# Patient Record
Sex: FEMALE | Race: OTHER | NOT HISPANIC OR LATINO
[De-identification: names, ages, dates, MRNs, and addresses within clinical notes are randomized per-mention and may not be internally consistent; named-entity substitution may affect disease eponyms.]

---

## 2021-03-30 PROBLEM — Z00.00 ENCOUNTER FOR PREVENTIVE HEALTH EXAMINATION: Status: ACTIVE | Noted: 2021-03-30

## 2021-04-06 ENCOUNTER — APPOINTMENT (OUTPATIENT)
Dept: CARDIOLOGY | Facility: CLINIC | Age: 86
End: 2021-04-06
Payer: MEDICARE

## 2021-04-06 VITALS
TEMPERATURE: 98.4 F | OXYGEN SATURATION: 94 % | WEIGHT: 127 LBS | SYSTOLIC BLOOD PRESSURE: 110 MMHG | BODY MASS INDEX: 26.66 KG/M2 | DIASTOLIC BLOOD PRESSURE: 78 MMHG | HEIGHT: 58 IN | HEART RATE: 143 BPM

## 2021-04-06 DIAGNOSIS — Z78.9 OTHER SPECIFIED HEALTH STATUS: ICD-10-CM

## 2021-04-06 DIAGNOSIS — Z87.898 PERSONAL HISTORY OF OTHER SPECIFIED CONDITIONS: ICD-10-CM

## 2021-04-06 DIAGNOSIS — I48.91 UNSPECIFIED ATRIAL FIBRILLATION: ICD-10-CM

## 2021-04-06 DIAGNOSIS — I10 ESSENTIAL (PRIMARY) HYPERTENSION: ICD-10-CM

## 2021-04-06 DIAGNOSIS — I50.9 HEART FAILURE, UNSPECIFIED: ICD-10-CM

## 2021-04-06 DIAGNOSIS — Z86.79 PERSONAL HISTORY OF OTHER DISEASES OF THE CIRCULATORY SYSTEM: ICD-10-CM

## 2021-04-06 PROCEDURE — 93000 ELECTROCARDIOGRAM COMPLETE: CPT

## 2021-04-06 PROCEDURE — 99205 OFFICE O/P NEW HI 60 MIN: CPT

## 2021-04-06 RX ORDER — FUROSEMIDE 40 MG/1
40 TABLET ORAL
Qty: 30 | Refills: 1 | Status: ACTIVE | COMMUNITY
Start: 2021-04-06 | End: 1900-01-01

## 2021-04-06 NOTE — REASON FOR VISIT
[Consultation] : a consultation regarding [Atrial Fibrillation] : atrial fibrillation [Heart Failure] : congestive heart failure [Other: _____] : [unfilled]

## 2021-04-07 PROBLEM — I50.9 CONGESTIVE HEART FAILURE, UNSPECIFIED HF CHRONICITY, UNSPECIFIED HEART FAILURE TYPE: Status: ACTIVE | Noted: 2021-04-06

## 2021-04-07 PROBLEM — I10 HYPERTENSION, UNSPECIFIED TYPE: Status: ACTIVE | Noted: 2021-04-06

## 2021-04-07 PROBLEM — Z78.9 CAFFEINE USE: Status: ACTIVE | Noted: 2021-04-06

## 2021-04-07 PROBLEM — Z86.79 HISTORY OF ATRIAL FIBRILLATION: Status: RESOLVED | Noted: 2021-04-06 | Resolved: 2021-04-07

## 2021-04-07 PROBLEM — Z87.898 HISTORY OF EDEMA: Status: RESOLVED | Noted: 2021-04-06 | Resolved: 2021-04-07

## 2021-04-07 PROBLEM — Z78.9 NON-SMOKER: Status: ACTIVE | Noted: 2021-04-06

## 2021-04-07 RX ORDER — MEMANTINE HYDROCHLORIDE 7 MG/1
7 CAPSULE, EXTENDED RELEASE ORAL
Qty: 30 | Refills: 0 | Status: ACTIVE | COMMUNITY
Start: 2020-12-15

## 2021-04-07 RX ORDER — ZOLPIDEM TARTRATE 5 MG/1
5 TABLET ORAL
Qty: 15 | Refills: 0 | Status: ACTIVE | COMMUNITY
Start: 2020-11-23

## 2021-04-07 RX ORDER — AMMONIUM LACTATE 12 %
12 CREAM (GRAM) TOPICAL
Qty: 140 | Refills: 0 | Status: ACTIVE | COMMUNITY
Start: 2021-01-13

## 2021-04-07 RX ORDER — ICOSAPENT ETHYL 1000 MG/1
1 CAPSULE ORAL
Qty: 120 | Refills: 0 | Status: ACTIVE | COMMUNITY
Start: 2021-01-13

## 2021-04-07 RX ORDER — MEMANTINE HYDROCHLORIDE 14 MG/1
14 CAPSULE, EXTENDED RELEASE ORAL
Qty: 90 | Refills: 0 | Status: ACTIVE | COMMUNITY
Start: 2021-03-16

## 2021-04-07 RX ORDER — FUROSEMIDE 20 MG/1
20 TABLET ORAL
Qty: 30 | Refills: 0 | Status: ACTIVE | COMMUNITY
Start: 2021-02-18

## 2021-04-07 RX ORDER — GABAPENTIN 100 MG/1
100 CAPSULE ORAL
Qty: 30 | Refills: 0 | Status: ACTIVE | COMMUNITY
Start: 2021-03-16

## 2021-04-07 RX ORDER — KETOCONAZOLE 20 MG/G
2 CREAM TOPICAL
Qty: 60 | Refills: 0 | Status: ACTIVE | COMMUNITY
Start: 2021-01-14

## 2021-04-07 RX ORDER — AZITHROMYCIN 250 MG/1
250 TABLET, FILM COATED ORAL
Qty: 6 | Refills: 0 | Status: ACTIVE | COMMUNITY
Start: 2021-01-07

## 2021-04-07 RX ORDER — LOSARTAN POTASSIUM 25 MG/1
25 TABLET, FILM COATED ORAL
Qty: 60 | Refills: 0 | Status: ACTIVE | COMMUNITY
Start: 2021-01-13

## 2021-04-07 RX ORDER — METOPROLOL TARTRATE 25 MG/1
25 TABLET, FILM COATED ORAL
Qty: 30 | Refills: 0 | Status: ACTIVE | COMMUNITY
Start: 2021-03-16

## 2021-04-07 RX ORDER — DOXEPIN 3 MG/1
3 TABLET, FILM COATED ORAL
Qty: 30 | Refills: 0 | Status: ACTIVE | COMMUNITY
Start: 2021-03-16

## 2021-04-07 NOTE — ASSESSMENT
[FreeTextEntry1] : 89 yo Iraqi-speaking F with HTN, HLD, paroxysmal A Fib not on systemic anticoagulation who presents for initial evaluation of exertional dyspnea. \par \par 1. Dyspnea: Based on exam patient is likely in heart failure exacerbation (?HFpEF based on recent reported echo), which in turn may be potentiated by atrial fibrillation\par -Given patient's presentation, symptoms and comorbidities, I recommended that she be taken urgently to the emergency room for further treatment. Family declined for me to call an ambulance to the office, they will take patient to the ER themselves. \par -Family asked to renew furosemide; I agreed to do so but stated it does not replace patient going to the ER\par -Family were concerned about possibility of exposure to COVID in the hospital, however I reiterated that her current heart failure and A fib with RVR is the more pressing medical need and may be fatal if this is not promptly addressed in an inpatient setting. \par \par 2. Atrial fibrillation: \par -Due to low BP, cannot increase AV luisa blocker dose for HR control. Digoxin is possible but has higher risk of toxicity in elderly. Amiodarone or even DCCV may be needed however would not do so without ability to anticoagulate. \par -Patient's CHADS2-VASc score is at least 4 (HTN, age+2, female), corresponding to ~4% annual risk of stroke if off systemic anticoagulation. Higher if she has systolic HF, DM, or vascular disease. \par -I would have family clarify with her ophthalmologist exactly what is the risk to her vision with systemic anticoagulant such as Eliquis, because I would recommend NOAC if at all possible. \par \par 3. HTN: Well controlled on losartan and metoprolol\par \par 4. HLD: On Vascepa\par -Family signed release of records form so that our office can obtain most recent blood work as well as prior cardiac testing results (i.e. echo)\par \par FUV after discharge from hospital

## 2021-04-07 NOTE — HISTORY OF PRESENT ILLNESS
[FreeTextEntry1] : 89 yo [place]orran-speaking F with HTN, HLD, paroxysmal A Fib not on systemic anticoagulation who presents for initial evaluation. Patient is hard of hearing and minimizes most symptoms, so history is mostly provided by her family at the office. Patient has been having exertional dyspnea for the past 4-5 weeks. This correlated to when she developed an episode of A fib, but unlike her previous pAf episodes this one did not spontaneously resolve. Dyspnea has been accompanied by B/L LE edema. No associated chest pain, palpitations, lightheadedness, or syncope. Patient had echocardiogram ~1 month ago with reportedly normal EF. She has never had an ischemic work-up previously. She is reportedly not on systemic anticoagulation because she has macular degeneration and there is increased risk of progression to blindness with blood thinners per her granddaughter\par

## 2021-06-22 ENCOUNTER — APPOINTMENT (OUTPATIENT)
Dept: CARDIOLOGY | Facility: CLINIC | Age: 86
End: 2021-06-22

## 2021-07-12 ENCOUNTER — APPOINTMENT (OUTPATIENT)
Dept: CARDIOLOGY | Facility: CLINIC | Age: 86
End: 2021-07-12